# Patient Record
Sex: FEMALE | Race: WHITE
[De-identification: names, ages, dates, MRNs, and addresses within clinical notes are randomized per-mention and may not be internally consistent; named-entity substitution may affect disease eponyms.]

---

## 2017-04-02 ENCOUNTER — HOSPITAL ENCOUNTER (EMERGENCY)
Dept: HOSPITAL 25 - ED | Age: 52
Discharge: HOME | End: 2017-04-02
Payer: COMMERCIAL

## 2017-04-02 VITALS — SYSTOLIC BLOOD PRESSURE: 98 MMHG | DIASTOLIC BLOOD PRESSURE: 62 MMHG

## 2017-04-02 DIAGNOSIS — R51: ICD-10-CM

## 2017-04-02 DIAGNOSIS — M47.892: ICD-10-CM

## 2017-04-02 DIAGNOSIS — M54.2: ICD-10-CM

## 2017-04-02 DIAGNOSIS — S09.90XA: Primary | ICD-10-CM
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
## 2017-04-02 PROCEDURE — 99282 EMERGENCY DEPT VISIT SF MDM: CPT

## 2017-04-02 PROCEDURE — 70450 CT HEAD/BRAIN W/O DYE: CPT

## 2017-04-02 PROCEDURE — 72125 CT NECK SPINE W/O DYE: CPT
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
## 2017-04-02 NOTE — RAD
INDICATION:  Head injury.



COMPARISON:  Comparison is made with a prior CT of the brain from February 18, 2010.



TECHNIQUE: Contiguous axial sections of the brain were obtained from the skull base to the

vertex without contrast.



FINDINGS: The ventricles, cisterns and sulci are within normal limits.  No significant

focal abnormality or mass effect is seen. There is no evidence for hemorrhage.



No significant focal osseous abnormality is seen. The visualized portion of the paranasal

sinuses and mastoid air cells appear clear.



IMPRESSION:  NO EVIDENCE FOR ACUTE INTRACRANIAL ABNORMALITY.

## 2017-04-02 NOTE — RAD
INDICATION:  Head injury, neck pain.



COMPARISON:  Comparison is made with a prior CT of the cervical spine from February 18, 2010.



TECHNIQUE: Contiguous axial sections were obtained from the skull base through the T1

vertebra. Images were reconstructed in the sagittal and coronal planes.



FINDINGS: There is straightening of the cervical spine with loss of the normal cervical

lordosis. No prevertebral soft tissue swelling or fracture is seen. There is a small area

of decreased density in the lateral aspect of the C5 vertebral body which is unchanged

from the prior CT study.



At the C3-C4 level there is mild right posterior lateral uncinate process spurring. No

significant spinal canal narrowing is present. There is mild neural foraminal narrowing on

the right side.



At the C4-C5 level no disc bulge or herniation is seen. No spinal canal or neural

foraminal narrowing is noted.



At the C5-C6 level there is posterior uncinate process spurring which causes mild spinal

canal narrowing and moderate bilateral neural foraminal narrowing right greater than left.



At the C6-C7 level there is mild right posterior lateral uncinate process spurring. No

spinal canal narrowing is present. There is mild neural foraminal narrowing on the right

side.



IMPRESSION:  

1. STRAIGHTENING OF THE CERVICAL SPINE, NO EVIDENCE FOR FRACTURE OR SUBLUXATION.

2. MILD CERVICAL SPONDYLOSIS.

## 2018-06-19 ENCOUNTER — HOSPITAL ENCOUNTER (EMERGENCY)
Dept: HOSPITAL 25 - ED | Age: 53
Discharge: HOME | End: 2018-06-19
Payer: COMMERCIAL

## 2018-06-19 VITALS — SYSTOLIC BLOOD PRESSURE: 104 MMHG | DIASTOLIC BLOOD PRESSURE: 71 MMHG

## 2018-06-19 DIAGNOSIS — M54.2: ICD-10-CM

## 2018-06-19 DIAGNOSIS — Z82.49: ICD-10-CM

## 2018-06-19 DIAGNOSIS — Z88.0: ICD-10-CM

## 2018-06-19 DIAGNOSIS — J45.909: ICD-10-CM

## 2018-06-19 DIAGNOSIS — R11.2: ICD-10-CM

## 2018-06-19 DIAGNOSIS — G43.909: Primary | ICD-10-CM

## 2018-06-19 PROCEDURE — 99283 EMERGENCY DEPT VISIT LOW MDM: CPT

## 2018-06-19 PROCEDURE — 96375 TX/PRO/DX INJ NEW DRUG ADDON: CPT

## 2018-06-19 PROCEDURE — 96374 THER/PROPH/DIAG INJ IV PUSH: CPT

## 2018-06-19 PROCEDURE — 96361 HYDRATE IV INFUSION ADD-ON: CPT

## 2018-06-19 NOTE — ED
Shelley BARFIELD Simon, scribed for Pedro Howard MD on 06/19/18 at 1404 .





Headache





- HPI Summary


HPI Summary: 





This patient is a 52 year old F presenting to [facility] with a chief complaint of 

HA at 10/10 severity since 6/15 night/6/16 morning. Took imitrex pills 1st day, 

then took zomig, imitrex injection today, little help of sx from these 

medications. Today the pain is not quite as severe due to the Demerol she took. 

She tried her fathers Darvon, which did not alleviate sx. She notes the pain 

is primarily located on the left side, but is fairly diffuse; this is typical 

for her migraine sx, but the pain persisting after medication is not. She 

endorses nausea, emesis, dry-heaving, diaphoresis, neck stiffness, insomnia (

ambien did not help), constipation (which she attributes to her meds), and 

sensitivity to sensory stimulation (sound, smell, light). On 6/17 she noted 

tingling, numbness, surrealness, and thought disturbance. She denies fever, 

chills, abd pain, and loss of ROM.





- History Of Current Complaint


Chief Complaint: EDHeadache


Stated Complaint: MIGRAINE


Time Seen by Provider: 06/19/18 13:52


Hx Obtained From: Patient


Hx Last Menstrual Period: 3/16/16


Onset/Duration: Gradual Onset, Started days ago, Still Present


Initially Headache Was: Initial Pain Scale(0-10)= - 10


Currently Pain Is: Current Pain Scale(0-10)= - 10


Timing: Constant, Days


Character: Migraine


Location of Headache: Diffuse, Temporal - left, Parietal - left


Aggravating Factor: Bright Lights, Other - olfactory, auditory, visual sensory 

stimulation in general.


Allevating Factors: Medication - slightly, Other (Noted In Comments) - ice, 

mild help


Associated Signs And Symptoms: Nausea, Vomiting, Neck Pain


Related History: Similar Episode/DX As: - previous migraines





- Allergies/Home Medications


Allergies/Adverse Reactions: 


 Allergies











Allergy/AdvReac Type Severity Reaction Status Date / Time


 


MS Amoxicillin [Amoxicillin] Allergy Intermediate Hives Verified 06/19/18 12:13


 


environmen Allergy  hives Uncoded 06/19/18 12:13





   from,  





   dust,  





   mold,  





   pollen  











Home Medications: 


 Home Medications





Erythromycin TAB* 250 mg PO BID 06/19/18 [History Confirmed 06/19/18]


Meloxicam(NF) [Mobic(NF)] 7.5 mg PO BID 06/19/18 [History Confirmed 06/19/18]


Memantine TAB* [Namenda TAB*] 10 mg PO DAILY 06/19/18 [History Confirmed 06/19/ 18]


Omeprazole CAP* [Prilosec CAP* 20 MG] 40 mg PO BID 06/19/18 [History Confirmed 

06/19/18]


Spironolactone TAB* [Aldactone TAB*] 50 mg PO BID 06/19/18 [History Confirmed 06 /19/18]


Vortioxetine (NF) [Trintellix (NF)] 10 mg PO DAILY 06/19/18 [History Confirmed 

06/19/18]


predniSONE TAB* [Deltasone 20 MG TAB*] 20 mg PO DAILY 06/19/18 [History 

Confirmed 06/19/18]











PMH/Surg Hx/FS Hx/Imm Hx


Endocrine/Hematology History: 


   Denies: Hx Diabetes


Cardiovascular History: 


   Denies: Hx Congestive Heart Failure, Hx Hypertension


Respiratory History: Reports: Hx Asthma


 History: 


   Denies: Hx Renal Disease


Sensory History: 


   Denies: Hx Legally Blind, Hx Deafness


Opthamlomology History: 


   Denies: Hx Legally Blind


EENT History: 


   Denies: Hx Deafness


Neurological History: Reports: Hx Migraine





- Cancer History


Hx Chemotherapy: No


Hx Radiation Therapy: No





- Surgical History


Surgery Procedure, Year, and Place: rhinoplasty/bilateral breast reductions/

tonsils


Infectious Disease History: No


Infectious Disease History: Reports: Hx of Known/Suspected MRSA, Hx Shingles


   Denies: Traveled Outside the [place] in Last 30 Days





- Family History


Known Family History: Positive: Hypertension





- Social History


Alcohol Use: None


Substance Use Type: Reports: None


Smoking Status (MU): Never Smoked Tobacco





Review of Systems


Positive: Skin Diaphoresis.  Negative: Fever, Chills


Positive: Photophobia


Positive: Other - Sensitive to/pain from olfactory, auditory stimulation


Positive: Vomiting, Nausea, Other - constipation.  Negative: Abdominal Pain


Positive: Arthralgia, Myalgia - neck, linked to migraine.  Negative: Decreased 

ROM


Neurological: Other - thought disturbance, surrealness


Positive: Headache, Paresthesia, Numbness


All Other Systems Reviewed And Are Negative: Yes





Physical Exam





- Summary


Physical Exam Summary: 





General: well-appearing, mild pain distress


Skin: warm, color reflects adequate perfusion, dry


Head: normal


Eyes: EOMI, IRENE


ENT: normal


Neck: supple, nontender


Respiratory: CTA, breath sounds present


Cardiovascular: RRR


Abdomen: soft, nontender


Bowel: present


Musculoskeletal: normal, strength/ROM intact


Neurological: sensory/motor intact, A&O x3


Psychological: affect/mood appropriate


Triage Information Reviewed: Yes


Vital Signs On Initial Exam: 


 Initial Vitals











Temp Pulse Resp BP Pulse Ox


 


 97.9 F   79   16   132/88   99 


 


 06/19/18 12:11  06/19/18 12:11  06/19/18 12:11  06/19/18 12:11  06/19/18 12:11











Vital Signs Reviewed: Yes





Diagnostics





- Vital Signs


 Vital Signs











  Temp Pulse Resp BP Pulse Ox


 


 06/19/18 13:40   68   134/89  100


 


 06/19/18 12:11  97.9 F  79  16  132/88  99














- Laboratory


Lab Statement: Any lab studies that have been ordered have been reviewed, and 

results considered in the medical decision making process.





Re-Evaluation





- Re-Evaluation


  ** First Eval


Re-Evaluation Time: 15:27


Change: Improved


Comment: Seeing if sx improved at all (they did, to 8/10 now).





  ** Second Eval


Re-Evaluation Time: 17:15


Change: Improved


Comment: Pt's HA is "gone".





Headache Course/Dx





- Course


Course Of Treatment: Medications reviewed. Allergies noted.  HEADACHE IMPROVED 

IN THE ED.





- Diagnoses


Provider Diagnoses: 


 Migraine








Discharge





- Sign-Out/Discharge


Documenting (check all that apply): Discharge/Admit/Transfer





- Discharge Plan


Condition: Stable


Disposition: HOME


Prescriptions: 


HYDROcodone/ACETAMIN 5-325 MG* [Norco 5-325 TAB*] 1 tab PO Q4H PRN #10 tab MDD 6


 PRN Reason: Pain


Patient Education Materials:  Migraine Headache (ED)


Referrals: 


Dmitry Miguel MD [Primary Care Provider] - 


Additional Instructions: 


FOLLOW UP WITH YOUR DOCTOR.


RETURN TO THE EMERGENCY DEPARTMENT FOR ANY WORSENING OF YOUR CONDITION OR 

QUESTIONS OR CONCERNS.





- Billing Disposition and Condition


Condition: STABLE


Disposition: Home





The documentation as recorded by the Shelley gonzáles Simon accurately 

reflects the service I personally performed and the decisions made by me, 

Pedro Howard MD.

## 2018-07-13 ENCOUNTER — HOSPITAL ENCOUNTER (EMERGENCY)
Dept: HOSPITAL 25 - UCEAST | Age: 53
Discharge: HOME | End: 2018-07-13
Payer: COMMERCIAL

## 2018-07-13 VITALS — DIASTOLIC BLOOD PRESSURE: 77 MMHG | SYSTOLIC BLOOD PRESSURE: 120 MMHG

## 2018-07-13 DIAGNOSIS — Z86.14: ICD-10-CM

## 2018-07-13 DIAGNOSIS — L50.9: Primary | ICD-10-CM

## 2018-07-13 DIAGNOSIS — K21.9: ICD-10-CM

## 2018-07-13 DIAGNOSIS — Z86.19: ICD-10-CM

## 2018-07-13 DIAGNOSIS — Z88.0: ICD-10-CM

## 2018-07-13 DIAGNOSIS — G43.909: ICD-10-CM

## 2018-07-13 DIAGNOSIS — F32.9: ICD-10-CM

## 2018-07-13 DIAGNOSIS — J45.909: ICD-10-CM

## 2018-07-13 DIAGNOSIS — Z82.49: ICD-10-CM

## 2018-07-13 PROCEDURE — G0463 HOSPITAL OUTPT CLINIC VISIT: HCPCS

## 2018-07-13 PROCEDURE — 99212 OFFICE O/P EST SF 10 MIN: CPT

## 2018-07-13 NOTE — XMS REPORT
Elda Parks

 Created on:2018



Patient:Elda Parks

Sex:Female

:1965

External Reference #:[id number]





Demographics







 Address  [address] [place] [address]

 

 Mobile Phone  [phone]

 

 Email Address  [email or web address]

 

 Preferred Language  English

 

 Marital Status  Not  Or 

 

 Mormonism Affiliation  Unknown

 

 Race  White

 

 Ethnic Group  Not  Or 









Author







 Organization  [facility]

 

 Address  [address]



   [place] [address]

 

 Phone  [phone]









Support







 Name  Relationship  Address  Phone

 

 Janelle Parks  Unavailable  Unavailable  [phone]









Care Team Providers







 Name  Role  Phone

 

 Dmitry Miguel MD  Primary Care Physician  Unavailable









Payers







 Type  Date  Identification Numbers  Payment Provider  Subscriber

 

 Commercial  Effective:  Policy Number: [id number]  [employer]  Elda Parks



   2018    Medicaid  









 PayID: [id number]  [address] [address]









 [place] [address]









 Workers  Effective:  Policy Number:  Tamir Parks



 Compensation  2016  [id number]  [place]  









 Onset: 2016  Group Name: [employer] [phone]  [address]









 [place]: [place] [id number]







Problems







 Date  Description  Provider  Status

 

 Onset: 2016  Injury of shoulder region  Elza Douglass MD  Active

 

 Onset: 2016  Sprain of shoulder and upper arm  Elza Douglass MD  Active

 

 Onset: 2016  Brachial neuritis  Elza Douglass MD  Active







Family History







 Date  Family Member(s)  Problem(s)  Comments

 

   General  Heart Disease  

 

   General  Diabetes  

 

   General  Cancer  

 

   Father  Kidney Cancer  

 

   Grandmother  Stomach Cancer  







Social History







 Type  Date  Description  Comments

 

 Marital Status    Single  

 

 Lives With    Alone  

 

 Occupation    Manager  Hotel

 

 ETOH Use    Denies alcohol use  

 

 Smoking    Patient has never smoked  

 

 Recreational Drug Use    Denies Drug Use  

 

 Exercise Type/Frequency    Does not exercise  







Allergies, Adverse Reactions, Alerts







 Date  Description  Reaction  Status  Severity  Comments

 

 2011  Amoxicillin  rash  active    







Medications







 Medication  Date  Status  Form  Strength  Qnty  SIG  Indications  Ordering



                 Provider

 

 Lidocaine 4% Gel    Active    4%  60gra  apply to    Delmer RENO



   /2018        ms  anal canal    MD Reji



             twice a day    

 

 Prednisone    Active  Tablets  20mg  3tabs  2 tabs by    .



             mouth day    Natividad,



             #1, 1 tab    M.D.



             by mouth    



             day #2,    



             take before    



             work, as    



             this    



             medication    



             may prevent    



             sleep    

 

 Zofran    Active  Tablets  4mg  15tab  take 1 by            s  mouth twice    Natividad,



             a day as    M.D.



             needed for    



             nausea    

 

 Aimovig    Active  Solution  70mg/ml  1ml  inject sq        Auto-Inje      once a    Natividad,



       ct      month    M.DJose Rafael

 

 Zonisamide    Active  Capsules  100mg  60cap  1 by mouth  G43.709          s  twice a day    CARLOS Henson

 

 Spironolactone    Active  Tablets  50mg  30tab  1 po qd    Unknown



   /        s      

 

 Ventolin HFA    Active  Aerosol  108(90Bas  1mont  2 puffs po    Unknown



   /      e) mcg/ac  h  qid prn    

 

 Excedrin Extra    Active  Tablets  250-250-6  100ta  1-2 bid prn    
Unknown



 Strength  /0000      5mg  bs      

 

 Bupropion HCL ER    Active  Tablets  150mg    3 tabs qd    Bezirgani



 (XL)  /0000    ER 24HR          Anson julio MD

 

 Hydrocodone-Acetam    Active  Tablets  5-325mg    Take 1    Unknown



 inophen  /          Tablet By    



             Mouth Every    



             6 Hours as    



             Deeded    

 

 Meloxicam    Active  Tablets  15mg    Take 1/2    Unknown



   /          Tablet By    



             Mouth 2    



             Times A Day    

 

 Zolpidem Tartrate    Active  Tablets  12.5mg    1 by mouth    Unknown



 ER  /0000    ER      every night    



             at bedtime    



             prn    

 

 Omeprazole    Active  Capsules  40mg    1 by mouth    Unknown



   /0000    DR      every day    

 

 Propranolol HCL    Active  Tablets  20mg    2 by mouth    Unknown



   /0000          twice a day    

 

 Iron    Active  Tablets  325(65Fe)    1 by mouth    Unknown



   /0000      mg    every day    

 

 Trazodone HCL    Active  Tablets  100mg    1 by mouth    Unknown



   /0000          every night    



             at bedtime    

 

 Imipramine    Active  Capsules  50mg    1 take by    Unknown



   /0000          mouth every    



             day once    



             daiy    

 

 Multi Vitamin    Active  Tablets          Unknown



 Daily  /0000              

 

 Calcium Citrate +    Active  Tablets  600mg    a day    Unknown



   /0000              

 

 Vitamin C  00/00  Active  Chewtabs  500mg    take 1 by    Unknown



   /          mouth every    



             day - take    



             this when    



             you take    



             your iron    



             pill    

 

 Imitrex Statdose    Active  Solution  4mg/0.5ML    inject at    Unknown



 System  /    Auto-Inje      the onset    



       ct      of    



             migraine,    



             may repeat    



             after 1    



             hour if    



             needed; max    



             2/day, max    



             2days/wk    

 

 Xanax    Active  Tablets      by mouth    Unknown



             three times    



             a day as    



             needed    

 

 Duloxetine HCL    Active  Caps DR  60mg    2 by mouth    Unknown



       Part      every day    

 

 Zyrtec Allergy    Active  Capsules  10mg    1 by mouth    Unknown



             every day    



             prn    

 

 Imipramine HCL    Active  Tablets  25mg    1 tab po at    Unknown



   /0000          hs    

 

 Erythromycin    Active  Tablets  250mg    1 by mouth    Unknown



             twice a day    



             for acne    

 

 Vitamin D3 Super    Active  Capsules      1 by mouth    Unknown



 Strength  /0000          every day    

 

                 

 

 Memantine HCL    Hx  Tablets  10mg  30tab  1 po a day  G43.009   S.



   /        pipo Henson M.D.

 

 Zolmitriptan    Hx  Tablets  5mg  9tabs  1 po bid  G43.719  .



   /          prMarcelle Guerra          migraine    M.D.



             max 2 per    



             day, 2    



             days/wk    

 

 Leflunomide    Hx  Tablets  20mg  30tab  1 po qd            pipo Jacinto M.D.

 

 Folic Acid    Hx  Tablets  1mg  30tab  1 po qd            Marcelle Madera M.D.



                 

 

 Prednisone    Hx  Tablets  1mg  60tab  2 po qd            s      Marcelle Jacinto              MJose RafaelDJose Rafael



                 

 

 Hydroxychloroquine    Hx  Tablets  200mg  60tab  1 po bid    Gilberto



 Sulfate  /2011        s      Ophelia



   -              M.DJose Rafael



                 

 

 Methotrexate    Hx  Tablets  2.5mg  24tab  Take Six            s  Tablets By    Endo,



   -          Mouth Once    M.D.



             A Week    



   /2011              

 

 Fluoxetine HCL    Hx  Capsules  40mg  90cap  1 po qd    Unknown



           s      

 

 Sumatriptan    Hx  Tablets  25mg  30tab  1 tablet    Unknown



 Succinate  /0000        s  every 2    



   -          hourly    



             unitl    



   /2016          relief.    



             maximum 4    



             tablets    



             daily    

 

 Zyrtec Allergy    Hx  Tablets  10mg  30tab  1 po qd    Unknown



   /0000        s      



   -              



                 

 

 Propranolol HCL    Hx  Tablets  40mg  180ta  1 po bid    Unknown



   /0000        bs      



   -              



                 

 

 Naproxen Sodium    Hx  Tablets  220mg  60tab  1 po bid    Unknown



   /0000        s  prn    



   -              



                 

 

 Motrin Ib    Hx  Tablets  200mg  60tab  prn    Unknown



   /0000        s      



   -              



                 

 

 Abilify    Hx  Tablets  5mg  30tab  1 po qd    Unknown



   /        s      



   -              



                 

 

 Erythromycin Base    Hx  Tablets  250mg    Take 1    Unknown



   /0000          Tablet By    



             Mouth Twice    



             A Day    

 

 Voltaren    Hx  Gel  1%    Apply 2    Unknown



   /0000          [employer]    



   -          Topically    



   10/27          Twice Daily    



             as Needed    

 

 Cyclobenzaprine    Hx  Tablets  5mg    Take 1    Unknown



 HCL  /0000          Tablet By    



             Mouth 3    



             Times A Day    



             as Needed    



             For Muscle    



             Spasms    

 

 Imitrex    Hx  Tablets  100mg    take 1 by  G43.719  Unknown



   /0000          mouth as    



   -          need for    



             migraine,    



   /2017          may repeat    



             after 2    



             hours,    



             maximum 2    



             tablets in    



             24 hours,    



             maximum 2    



             days a    



             week.    







Medications Administered in Office







 Medication  Date  Status  Form  Strength  Qnty  SIG  Indications  Ordering



                 Provider

 

 Injection  10/28  Administered  Injection          Janet



 Onabotulinumtoxin  /              DESMOND Esquivel, 1 Unit                NP







Immunizations







 CPT Code  Status  Date  Vaccine  Lot #

 

 [id number]  Given  2011  Influenza Virus 3Yrs & Over  [id number]







Vital Signs







 Date  Vital  Result  Comment

 

 2018  Height  67 inches  5'7"









 Weight  206.00 lb  

 

 Heart Rate  88 /min  

 

 BP Systolic  118 mmHg  

 

 BP Diastolic  82 mmHg  

 

 Respiratory Rate  18 /min  

 

 Body Temperature  97.9 F  

 

 BMI (Body Mass Index)  32.3 kg/m2  









 2018  Height  67 inches  5'7"









 Weight  214.00 lb  

 

 Heart Rate  72 /min  

 

 BP Systolic  110 mmHg  

 

 BP Diastolic  88 mmHg  

 

 BMI (Body Mass Index)  33.5 kg/m2  









 2017  Height  67 inches  5'7"









 Weight  248.00 lb  

 

 Heart Rate  54 /min  

 

 BP Systolic  109 mmHg  

 

 BP Diastolic  56 mmHg  

 

 Body Temperature  97.6 F  

 

 Pain Level  4  

 

 BMI (Body Mass Index)  38.8 kg/m2  









 2017  Height  67 inches  5'7"









 Weight  240.00 lb  

 

 Heart Rate  82 /min  

 

 BP Systolic Sitting  128 mmHg  

 

 BP Diastolic Sitting  78 mmHg  

 

 Respiratory Rate  16 /min  

 

 BMI (Body Mass Index)  37.6 kg/m2  









 2017  Height  67 inches  5'7"









 Weight  245.00 lb  

 

 Heart Rate  72 /min  

 

 BP Systolic  130 mmHg  

 

 BP Diastolic  74 mmHg  

 

 Respiratory Rate  18 /min  

 

 Body Temperature  98.3 F  

 

 BMI (Body Mass Index)  38.4 kg/m2  









 2017  Height  67 inches  5'7"









 Weight  248.00 lb  

 

 Heart Rate  64 /min  

 

 Respiratory Rate  16 /min  

 

 Pain Level  4  

 

 BMI (Body Mass Index)  38.8 kg/m2  









 2016  Height  67 inches  5'7"









 Weight  248.00 lb  

 

 Respiratory Rate  18 /min  

 

 Pain Level  0  

 

 BMI (Body Mass Index)  38.8 kg/m2  









 10/28/2016  Height  67 inches  5'7"









 Weight  248.00 lb  

 

 Heart Rate  74 /min  

 

 BP Systolic Sitting  112 mmHg  

 

 BP Diastolic Sitting  76 mmHg  

 

 BMI (Body Mass Index)  38.8 kg/m2  









 10/18/2016  Height  67 inches  5'7"









 Weight  243.00 lb  

 

 Respiratory Rate  16 /min  

 

 Pain Level  2  

 

 BMI (Body Mass Index)  38.1 kg/m2  









 2016  Height  67 inches  5'7"









 Weight  243.00 lb  

 

 Heart Rate  76 /min  

 

 BP Systolic Sitting  108 mmHg  

 

 BP Diastolic Sitting  86 mmHg  

 

 Respiratory Rate  14 /min  

 

 BMI (Body Mass Index)  38.1 kg/m2  









 2016  Height  67 inches  5'7"









 Weight  240.00 lb  

 

 Respiratory Rate  16 /min  

 

 Pain Level  4  

 

 BMI (Body Mass Index)  37.6 kg/m2  









 2016  Height  67 inches  5'7"









 Weight  240.00 lb  

 

 BP Systolic  115 mmHg  

 

 BP Diastolic  74 mmHg  

 

 Pain Level  5  

 

 BMI (Body Mass Index)  37.6 kg/m2  









 2011  Height  63 inches  5'3"









 Weight  240.00 lb  

 

 Heart Rate  76 /min  

 

 BP Systolic Sitting  120 mmHg  

 

 BP Diastolic Sitting  75 mmHg  

 

 BMI (Body Mass Index)  42.5 kg/m2  









 2011  Height  63 inches  5'3"









 Weight  240.00 lb  

 

 Heart Rate  68 /min  

 

 BP Systolic  118 mmHg  

 

 BP Diastolic  74 mmHg  

 

 BMI (Body Mass Index)  42.5 kg/m2  









 2011  Height  63 inches  5'3"









 Weight  240.00 lb  

 

 Heart Rate  80 /min  

 

 BP Systolic  110 mmHg  

 

 BP Diastolic  72 mmHg  

 

 BMI (Body Mass Index)  42.5 kg/m2  







Results







 Test  Date  Test  Result  H/L  Range  Note

 

 CBC No Diff  2017  White Blood Count  9.1 10^3/uL    3.5-10.8  









 Red Blood Count  4.22 10^6/uL    4.0-5.4  

 

 Hemoglobin  12.9 g/dL    12.0-16.0  

 

 Hematocrit  38 %    35-47  

 

 Mean Corpuscular Volume  91 fL    80-97  

 

 Mean Corpuscular Hemoglobin  31 pg    27-31  

 

 Mean Corpuscular HGB Conc  34 g/dL    31-36  

 

 Red Cell Distribution Width  13 %    10.5-15  

 

 Platelet Count  296 10^3/uL    150-450  

 

 Mean Platelet Volume  9 um3    7.4-10.4  









 Comp Metabolic Panel  2017  Sodium  138 mmol/L    133-145  









 Potassium  3.9 mmol/L    3.5-5.0  

 

 Chloride  103 mmol/L    101-111  

 

 Co2 Carbon Dioxide  28 mmol/L    22-32  

 

 Anion Gap  7 mmol/L    2-11  

 

 Glucose  89 mg/dL      

 

 Blood Urea Nitrogen  12 mg/dL    6-24  

 

 Creatinine  0.91 mg/dL    0.51-0.95  

 

 BUN/Creatinine Ratio  13.2    8-20  

 

 Calcium  8.7 mg/dL    8.6-10.3  

 

 Total Protein  6.4 g/dL    6.4-8.9  

 

 Albumin  3.9 g/dL    3.2-5.2  

 

 Globulin  2.5 g/dL    2-4  

 

 Albumin/Globulin Ratio  1.6    1-3  

 

 Total Bilirubin  0.40 mg/dL    0.2-1.0  

 

 Alkaline Phosphatase  64 U/L      

 

 Alt  14 U/L    7-52  

 

 Ast  19 U/L    13-39  

 

 Egfr Non-  65.2    >60  

 

 Egfr   83.8    >60  1









 Iron & Iron Binding Capacity  2017  Iron  69 g/dL      









 Unsaturated Iron Binding  245 g/dL      

 

 Total Iron Binding Capacity  314 g/dL    250-450  

 

 % Iron Saturation  22 %    15-55  









 Laboratory test finding  2017  Cortisol  3.60 g/dL      2









 TSH (Thyroid Stim Horm)  4.45 mcIU/mL    0.34-5.60  

 

 Folic Acid (Folate)  > 20.00 ng/mL    >3.99  

 

 Vitamin B12  576 pg/mL    180-914  3

 

 Vitamin D Total 25(Oh)  28.2 ng/mL    20-50  

 

 Hemoglobin A1c  5.1 %    4.0-5.6  4

 

 Vitamin B1 (Whole Blood)  115 nmol/L      5









 Vitamin B6  2017  Pyridoxal 5-Phosphate  15 g/L    5-50  6









 Pyridoxic Acid  9 g/L    3-30  7









 1  *******Because ethnic data is not always readily available,



   this report includes an eGFR for both -Americans and



   non- Americans.****



   The National Kidney Disease Education Program (NKDEP) does



   not endorse the use of the MDRD equation for patients that



   are not between the ages of 18 and 70, are pregnant, have



   extremes of body size, muscle mass, or nutritional status,



   or are non- or non-.



   According to the National Kidney Foundation, irrespective of



   diagnosis, the stage of the disease is based on the level of



   kidney function:



   Stage Description                      GFR(mL/min/1.73 m(2))



   1     Kidney damage with normal or decreased GFR       90



   2     Kidney damage with mild decrease in GFR          60-89



   3     Moderate decrease in GFR                         30-59



   4     Severe decrease in GFR                           15-29



   5     Kidney failure                       <15 (or dialysis)

 

 2  AM 8.7-22.4



   PM <10

 

 3  Normal Range 180 to 914



   Indeterminate Range 145 to 180



   Deficient Range  <145

 

 4  Therapeutic target for the treatment of diabetes



   mellitus patients is <7% HBA1C, and in selective



   patients <6.0%.  Please refer to American Diabetes



   Association diabetic care guidelines for further



   information.

 

 5  -------------------ADDITIONAL INFORMATION-------------------



   This test was developed and its performance characteristics



   determined by [facility] in a manner consistent with CLIA



   requirements. This test has not been cleared or approved by



   the U.S. Food and Drug Administration.



   Test Performed by:



   [facility] - [address] [address]

 

 6  -------------------ADDITIONAL INFORMATION-------------------



   This test was developed and its performance characteristics



   determined by [facility] in a manner consistent with CLIA



   requirements. This test has not been cleared or approved by



   the U.S. Food and Drug Administration.

 

 7  -------------------ADDITIONAL INFORMATION-------------------



   This test was developed and its performance characteristics



   determined by [facility] in a manner consistent with CLIA



   requirements. This test has not been cleared or approved by



   the U.S. Food and Drug Administration.



   Test Performed by:



   [facility]



   [address] [place] [address]







Procedures







 Date  CPT Code  Description  Status

 

 2018  45078  Anoscopy  Completed

 

 2017  20166  Anoscopy  Completed

 

 2017    Mammogram  Completed

 

 10/28/2016  26622  Chemodenervation Of Muscles Innervated By Facial  Completed



     Nerves, Bilat  







Encounters







 Type  Date  Location  Provider  CPT E/M  Dx

 

 Office Visit  2018  [facility]  Abisai Henson,  [address]  
G43.009



   9:30a    M.D.    

 

 Office Visit  2017  [facility]  Elza Douglass MD  [address]  
S46.101D



   3:00p  [facility]      









 S46.011D

 

 S46.011D









 Office Visit  2017  3:15p  [facility]  Abisai NICOLE  [address]  
G43.719



       CARLOS Henson    

 

 Office Visit  2017 10:30a  [facility]  Delmer Mendoza,  
[address]  K64.4



     Veronica EPSTEIN    









 E66.01

 

 K21.9









 Office Visit  2017  8:15a  [facility]  Elza Douglass MD  
[address]  S46.101D



     [facility]      









 S46.011D

 

 M75.41

 

 M75.51

 

 M54.2









 Office Visit  2016  3:30p  [facility]  Elza Douglass MD  
[address]  S46.101A



     [facility]      









 S46.011A

 

 M54.12

 

 S46.101D

 

 S46.011A

 

 S46.011D









 Office Visit  10/18/2016  1:00p  [facility]  Elza Douglass MD  
[address]  S46.101A



     [facility]      









 S46.011A

 

 M54.12

 

 S46.101D

 

 S46.011A

 

 S46.011D









 Office Visit  2016 11:00a  [facility]  Abisai Henson,  [address]  
G43.719



     [facility] G[facility]ZALE[facility]    

 

 Office Visit  2016  1:00p  [facility]  Elza Douglass [place]  [address]  
S46.101A



     Of [facility]      









 S46.011A

 

 S46.011A

 

 M54.12

 

 M54.12

 

 M75.21

 

 M75.21









 Office Visit  2016  2:00p  [facility]  Elza Douglass MD  
[address]  S46.101A



     [facility]      









 S46.101A

 

 S46.011A

 

 S46.011A

 

 M25.511

 

 M25.511









 Office Visit  2011  9:20a  [facility]  Gilberto Jacinto  [address]  
V04.81



     Of [facility] GONZALEZ    









 696.0

 

 V58.69









 Office Visit  2011  3:20p  [facility]  Gilberto Jacinto M.D.  
[address]  696.0



     Of [facility]      









 V58.69

 

 354.0









 Office Visit  2011 11:20a  [facility]  Gilberto Jacinto M.D.  
[address]  696.0



     Of [facility]      









 V58.69







Plan of Care

Future Appointment(s):2018  3:00 pm - [facility] Mendoza MD at [facility]2018  3:45 pm - Abisai Henson M.D. at [facility]2018 - [facility] Mendoza MDK60.1 Chronic anal 
xhhkldkZ12.4 Residual hemorrhoidal skin tags

## 2018-07-13 NOTE — UC
Skin Complaint HPI





- HPI Summary


HPI Summary: 





This pt is a 53 y/o female presenting to [facility] c/o rash on the right side of 

abdomen for the past few days now. She describes her rash as red and pruritic. 

Pt notes her rash has become very pruritic. Pt reports her rash is also painful 

on palpation. Denies involvement of mouth or face, nausea, vomiting, fever, 

chills, difficulty breathing. 


PMHx: shingles, MRSA. 





- History of Current Complaint


Chief Complaint: UCRash


Time Seen by Provider: 07/13/18 16:03


Stated Complaint: RASH


Hx Obtained From: Patient


Hx Last Menstrual Period: [id number]


Onset/Duration: Lasting Days, Still Present


Skin Exposure Onset/Duration: Days Ago


Timing: Constant


Current Severity: Moderate


Pain Intensity: 6


Pain Scale Used: 0-10 Numeric


Location: Other - Right side of torso


Character: Pruritus, Pain


Aggravating Factor(s): Nothing


Alleviating Factor(s): Nothing


Associated Signs & Symptoms: Positive: Rash.  Negative: Nausea, Vomiting, 

Difficulty Breathing, Fever, Chills





- Allergy/Home Medications


Allergies/Adverse Reactions: 


 Allergies











Allergy/AdvReac Type Severity Reaction Status Date / Time


 


amoxicillin Allergy  Hives Verified 07/13/18 15:46


 


environmen Allergy  hives Uncoded 06/19/18 12:13





   from,  





   dust,  





   mold,  





   pollen  














Review of Systems


Constitutional: Negative


Skin: Rash


Eyes: Negative


ENT: Negative


Respiratory: Negative


Cardiovascular: Negative


Gastrointestinal: Negative


Genitourinary: Negative


Motor: Negative


Neurovascular: Negative


Musculoskeletal: Negative


Neurological: Negative


Psychological: Negative


All Other Systems Reviewed And Are Negative: Yes





PMH/Surg Hx/FS Hx/Imm Hx





- Additional Past Medical History


Additional PMH: 





PMHx: shingles, MRSA


Respiratory History: Asthma


GI/ History: Gastroesophageal Reflux


Neurological History: Migraine


Psychological History: Depression





- Surgical History


Surgical History: Yes


Surgery Procedure, Year, and Place: rhinoplasty/bilateral breast reductions/

tonsils





- Family History


Known Family History: Positive: Hypertension





- Social History


Alcohol Use: None


Substance Use Type: None


Smoking Status (MU): Never Smoked Tobacco





Physical Exam





- Summary


Physical Exam Summary: 





VITAL SIGNS: Reviewed.


GENERAL: Patient is a well-developed and nourished female who is lying 

comfortable in the stretcher. Patient is not in any acute respiratory distress.


HEAD AND FACE: Normocephalic


EYES: PERRLA, EOMI x 2.


EARS: Hearing grossly intact.


MOUTH: Oropharynx within normal limits.


NECK: Supple, trachea is midline, no adenopathy, no JVD, no carotid bruit.


CHEST: Symmetric, no tenderness at palpation


LUNGS: Clear to auscultation bilaterally. No wheezing or crackles.


CVS: Regular rate and rhythm, S1 and S2 present, no murmurs or gallops 

appreciated.


ABDOMEN: Soft, non-tender. Bowel sounds are normal. No abdominal abnormal 

pulsations.


EXTREMITIES: Full ROM in all major joints, no edema, no cyanosis or clubbing.


NEURO: Alert and oriented x 3. No acute neurological deficits. Speech is normal 

and follows commands.


SKIN: Dry and warm. Papular rash without vesicles on the right side of abdomen.


Triage Information Reviewed: Yes


Vital Signs: 


 Initial Vital Signs











Temp  97.4 F   07/13/18 15:39


 


Pulse  63   07/13/18 15:39


 


Resp  16   07/13/18 15:39


 


BP  120/77   07/13/18 15:39


 


Pulse Ox  99   07/13/18 15:39











Vital Signs Reviewed: Yes





Course/Dx





- Course


Course Of Treatment: Pt is a 53 y/o female presenting to [facility] c/o rash on the 

right side of abdomen for the past few days now. She describes her rash as red 

and pruritic. Pt notes her rash has become very pruritic. Pt reports her rash 

is also painful on palpation. Denies involvement of mouth or face, nausea, 

vomiting, fever, chills, difficulty breathing.  PMHx: shingles, MRSA.  On exam 

pt has urticaria rash. Pt will be discharged home with a prescription for 

Levocetirizine.  Pt was instructed to return to the urgent care or go to ER 

immediately if any of the symptoms return or worsens. Plan of care was 

discussed with the patient and pt understands and agrees. All questions were 

answered to patient satisfaction. There were no further complaints or concerns. 

Pt will be discharged to home with follow up from PCP. Pt is hemodynamically 

stable, alert and oriented x3.





- Diagnoses


Provider Diagnoses: Urticaria





Discharge





- Sign-Out/Discharge


Documenting (check all that apply): Patient Departure - Discharge





- Discharge Plan


Condition: Stable


Disposition: HOME


Prescriptions: 


LevoCETirizine TAB (NF) [Xyzal TAB (NF)] 5 mg PO DAILY #20 tab


Patient Education Materials:  Urticaria (ED)


Referrals: 


Dmitry Miguel MD [Primary Care Provider] - 


Additional Instructions: 


Take medications as instructed and adhere to plan


Take Acetaminophen or ibuprofen for pain or fever


Increase your fluid intake


Return to the  or go to the emergency department if symptoms worsen


Follow-up with primary care physician in next 2-3 days

## 2020-03-14 RX ADMIN — SODIUM CHLORIDE, SODIUM LACTATE, POTASSIUM CHLORIDE, AND CALCIUM CHLORIDE SCH MLS/HR: 600; 310; 30; 20 INJECTION, SOLUTION INTRAVENOUS at 15:30

## 2020-03-18 ENCOUNTER — HOSPITAL ENCOUNTER (INPATIENT)
Dept: HOSPITAL 25 - AA | Age: 55
LOS: 2 days | Discharge: HOME | DRG: 403 | End: 2020-03-20
Attending: SURGERY | Admitting: SURGERY
Payer: COMMERCIAL

## 2020-03-18 DIAGNOSIS — K21.9: ICD-10-CM

## 2020-03-18 DIAGNOSIS — E78.5: ICD-10-CM

## 2020-03-18 DIAGNOSIS — E66.01: Primary | ICD-10-CM

## 2020-03-18 DIAGNOSIS — Z82.49: ICD-10-CM

## 2020-03-18 DIAGNOSIS — G47.33: ICD-10-CM

## 2020-03-18 DIAGNOSIS — F41.8: ICD-10-CM

## 2020-03-18 DIAGNOSIS — Z80.8: ICD-10-CM

## 2020-03-18 DIAGNOSIS — M19.90: ICD-10-CM

## 2020-03-18 DIAGNOSIS — G43.909: ICD-10-CM

## 2020-03-18 DIAGNOSIS — Z79.899: ICD-10-CM

## 2020-03-18 DIAGNOSIS — Z88.1: ICD-10-CM

## 2020-03-18 DIAGNOSIS — J45.909: ICD-10-CM

## 2020-03-18 DIAGNOSIS — Z83.438: ICD-10-CM

## 2020-03-18 DIAGNOSIS — Z82.61: ICD-10-CM

## 2020-03-18 PROCEDURE — 74246 X-RAY XM UPR GI TRC 2CNTRST: CPT

## 2020-03-18 PROCEDURE — C1776 JOINT DEVICE (IMPLANTABLE): HCPCS

## 2020-03-18 PROCEDURE — 81025 URINE PREGNANCY TEST: CPT

## 2020-03-18 PROCEDURE — 43644 LAP GASTRIC BYPASS/ROUX-EN-Y: CPT

## 2020-03-18 PROCEDURE — 0D164ZA BYPASS STOMACH TO JEJUNUM, PERCUTANEOUS ENDOSCOPIC APPROACH: ICD-10-PCS | Performed by: SURGERY

## 2020-03-18 RX ADMIN — HEPARIN SODIUM SCH UNITS: 5000 INJECTION INTRAVENOUS; SUBCUTANEOUS at 22:34

## 2020-03-18 RX ADMIN — ONDANSETRON PRN MG: 2 INJECTION INTRAMUSCULAR; INTRAVENOUS at 13:16

## 2020-03-18 RX ADMIN — FENTANYL CITRATE PRN MCG: 0.05 INJECTION, SOLUTION INTRAMUSCULAR; INTRAVENOUS at 13:27

## 2020-03-18 RX ADMIN — HYDROMORPHONE HYDROCHLORIDE PRN MG: 1 INJECTION, SOLUTION INTRAMUSCULAR; INTRAVENOUS; SUBCUTANEOUS at 21:14

## 2020-03-18 RX ADMIN — HEPARIN SODIUM SCH UNITS: 5000 INJECTION INTRAVENOUS; SUBCUTANEOUS at 15:08

## 2020-03-18 RX ADMIN — ONDANSETRON PRN MG: 2 INJECTION INTRAMUSCULAR; INTRAVENOUS at 21:14

## 2020-03-18 RX ADMIN — KETOROLAC TROMETHAMINE SCH MG: 15 INJECTION, SOLUTION INTRAMUSCULAR; INTRAVENOUS at 19:13

## 2020-03-18 RX ADMIN — FENTANYL CITRATE PRN MCG: 0.05 INJECTION, SOLUTION INTRAMUSCULAR; INTRAVENOUS at 13:15

## 2020-03-18 RX ADMIN — KETOROLAC TROMETHAMINE SCH MG: 15 INJECTION, SOLUTION INTRAMUSCULAR; INTRAVENOUS at 13:15

## 2020-03-18 RX ADMIN — HYDROMORPHONE HYDROCHLORIDE PRN MG: 1 INJECTION, SOLUTION INTRAMUSCULAR; INTRAVENOUS; SUBCUTANEOUS at 13:08

## 2020-03-18 RX ADMIN — FENTANYL CITRATE PRN MCG: 0.05 INJECTION, SOLUTION INTRAMUSCULAR; INTRAVENOUS at 13:04

## 2020-03-18 RX ADMIN — FENTANYL CITRATE PRN MCG: 0.05 INJECTION, SOLUTION INTRAMUSCULAR; INTRAVENOUS at 13:10

## 2020-03-18 RX ADMIN — SODIUM CHLORIDE, SODIUM LACTATE, POTASSIUM CHLORIDE, AND CALCIUM CHLORIDE SCH MLS/HR: 600; 310; 30; 20 INJECTION, SOLUTION INTRAVENOUS at 21:00

## 2020-03-18 RX ADMIN — PROCHLORPERAZINE EDISYLATE PRN MG: 5 INJECTION INTRAMUSCULAR; INTRAVENOUS at 15:23

## 2020-03-18 NOTE — BRIEFOPN
Brief Operative/Procedure Note





- Operation Details


Pre-Op Diagnosis: Obesity


Post-Op Diagnosis: Obesity


Procedures: Laparoscopic madisyn en y gastric bypass


Surgeon(s)/Proceduralists: Dr. Mendoza.  Assist: COLIN Cortes


Anesthesia: GETA


Estimated Blood Loss: <20cc


Findings: As above


Specimen(s)/Culture(s) Description: None


Complications: None

## 2020-03-19 RX ADMIN — HYDROMORPHONE HYDROCHLORIDE PRN MG: 1 INJECTION, SOLUTION INTRAMUSCULAR; INTRAVENOUS; SUBCUTANEOUS at 11:58

## 2020-03-19 RX ADMIN — SODIUM CHLORIDE, SODIUM LACTATE, POTASSIUM CHLORIDE, AND CALCIUM CHLORIDE SCH MLS/HR: 600; 310; 30; 20 INJECTION, SOLUTION INTRAVENOUS at 03:49

## 2020-03-19 RX ADMIN — SODIUM CHLORIDE, SODIUM LACTATE, POTASSIUM CHLORIDE, AND CALCIUM CHLORIDE SCH MLS/HR: 600; 310; 30; 20 INJECTION, SOLUTION INTRAVENOUS at 11:13

## 2020-03-19 RX ADMIN — ONDANSETRON PRN MG: 2 INJECTION INTRAMUSCULAR; INTRAVENOUS at 08:32

## 2020-03-19 RX ADMIN — PROCHLORPERAZINE EDISYLATE PRN MG: 5 INJECTION INTRAMUSCULAR; INTRAVENOUS at 00:24

## 2020-03-19 RX ADMIN — KETOROLAC TROMETHAMINE SCH MG: 15 INJECTION, SOLUTION INTRAMUSCULAR; INTRAVENOUS at 18:56

## 2020-03-19 RX ADMIN — HEPARIN SODIUM SCH UNITS: 5000 INJECTION INTRAVENOUS; SUBCUTANEOUS at 21:15

## 2020-03-19 RX ADMIN — HEPARIN SODIUM SCH UNITS: 5000 INJECTION INTRAVENOUS; SUBCUTANEOUS at 13:26

## 2020-03-19 RX ADMIN — DEXTROSE MONOHYDRATE, SODIUM CHLORIDE, AND POTASSIUM CHLORIDE SCH MLS/HR: 50; 4.5; 1.49 INJECTION, SOLUTION INTRAVENOUS at 14:08

## 2020-03-19 RX ADMIN — SUMATRIPTAN SUCCINATE PRN MG: 100 TABLET, FILM COATED ORAL at 15:37

## 2020-03-19 RX ADMIN — KETOROLAC TROMETHAMINE SCH MG: 15 INJECTION, SOLUTION INTRAMUSCULAR; INTRAVENOUS at 01:05

## 2020-03-19 RX ADMIN — SUMATRIPTAN SUCCINATE PRN MG: 100 TABLET, FILM COATED ORAL at 22:33

## 2020-03-19 RX ADMIN — KETOROLAC TROMETHAMINE SCH MG: 15 INJECTION, SOLUTION INTRAMUSCULAR; INTRAVENOUS at 13:28

## 2020-03-19 RX ADMIN — HYDROCODONE BITARTRATE AND ACETAMINOPHEN PRN ML: 7.5; 325 SOLUTION ORAL at 21:14

## 2020-03-19 RX ADMIN — KETOROLAC TROMETHAMINE SCH MG: 15 INJECTION, SOLUTION INTRAMUSCULAR; INTRAVENOUS at 06:26

## 2020-03-19 RX ADMIN — DEXTROSE MONOHYDRATE, SODIUM CHLORIDE, AND POTASSIUM CHLORIDE SCH MLS/HR: 50; 4.5; 1.49 INJECTION, SOLUTION INTRAVENOUS at 23:23

## 2020-03-19 RX ADMIN — HEPARIN SODIUM SCH UNITS: 5000 INJECTION INTRAVENOUS; SUBCUTANEOUS at 06:23

## 2020-03-19 RX ADMIN — HYDROMORPHONE HYDROCHLORIDE PRN MG: 1 INJECTION, SOLUTION INTRAMUSCULAR; INTRAVENOUS; SUBCUTANEOUS at 00:27

## 2020-03-19 NOTE — OP
CC:  [facility]; Primary Care Doctor *

 

DATE OF OPERATION:  03/18/20 - ROOM #352

 

DATE OF BIRTH:  [birth year]

 

SURGEON:  Delmer Mendoza MD

 

ASSISTANT:  COLIN Choudhury

 

ANESTHESIA:  General anesthesia.

 

PRE-OP DIAGNOSIS:  Clinically severe obesity.

 

POST-OP DIAGNOSIS:  Clinically severe obesity.

 

OPERATIVE PROCEDURE:  Laparoscopic Alicja-en-Y gastric bypass.

 

ESTIMATED BLOOD LOSS:  Minimal.

 

FLUIDS:  Minimal crystalloid fluid given.  Please see anesthesia report for 
those details.

 

DRAINS:  None.

 

COUNTS:  Lap pad count and instrument count correct at the end of the procedure.

 

COMPLICATIONS:  None.

 

DESCRIPTION OF PROCEDURE:  The patient was identified in the preoperative area. 
Consent was signed.  She was marked, brought to the operating room, placed on 
the operating table in supine position.  Preoperative antibiotics were given. 
Sequential devices were placed on bilateral lower extremities.  General 
anesthesia was induced.  The patient's abdomen was prepped and draped in 
standard surgical fashion, and a time-out was performed.

 

Folds of the umbilicus were elevated anteriorly and a Veress needle was 
inserted into the abdominal cavity, which was then allowed to insufflate to a 
pressure of 15 mmHg.  The patient tolerated the insufflation well.  Midway 
between the xiphoid and umbilicus, an optical 12 mm trocar was inserted just 
left of midline.  Laparoscope was inserted through this and there was no 
evidence of injury from the trocar insertion or from the Veress needle, which 
was then removed.

 

Additional trocars were then placed in the following positions:  A 12 mm and 5 
mm in the right upper quadrant and a 12 mm and 5 mm in the left upper quadrant.

 

The table was placed in a steep reverse Trendelenburg.  A Yenny retractor 
was inserted through the subxiphoid incision and the liver was retracted 
anteriorly to the right.

 

This exposed the gastroesophageal fat pad which was grasped and retracted 
towards the right lower quadrant.  Blunt dissection was carried out to expose 
the left crura.

 

Next, we made a retrogastric tunnel at the second crossing vessel on the lesser 
curvature.  A 45 mm tan ALIYAH stapling device was fired across this and we 
completed the stomach pouch with 2 additional 60 mm stapling devices placing a 

32-Syriac orogastric tube prior to firing these.  The pouch appeared 
appropriate size. Hemostasis was achieved with additional clips at the staple 
lines.

 

Next, the transverse colon was reflected superiorly.  The ligament of Treitz 
was identified and approximately 40 cm was counted off.  A window was made in 
the mesentery and the small bowel was grasped in an omega loop and extended 
towards the stomach pouch.  It showed no evidence of tension.  Stay sutures 
were placed using 2- 0 silk sutures.

 

Next, gastrostomy was made anteriorly over the orogastric tube and enterotomy 
made and the two were matted with a 30 mm tan ALIYAH stapling device to create the 
gastric jejunostomy.  The common defect was reapproximated with a running 

3-0 PDS suture.

 

Next, we transected the bowel with 60 mm tan ALIYAH stapling device leaving just a 
small cuff of small bowel up against the gastrojejunostomy, which was then 
tested with a methylene blue dye test that was negative.

 

Attention was then turned towards the Alicja limb, approximately 70 cm was 
counted off, and this was placed in apposition to the biliopancreatic limb.  
Anastomosis was created with a 60 mm tan ALIYAH stapling device and the common 
defect was reapproximated with interrupted 2-0 silk sutures in a figure-of-
eight fashion.  The mesenteric defect was closed similarly.  Review of the 
abdomen showed no bleeding. The Yenny retractor was removed.  The table was 
repositioned back to neutral. The trocars were removed under direct vision and 
all incisions reapproximated with 4-0 Monocryl subcuticular sutures followed by 
Steri-Strips and sterile dressing. The patient tolerated the procedure well, 
was awoken up in the OR and transferred to the PACU in stable condition.

 

 [id number]/CPS #: [id number]

KAROLINA

## 2020-03-19 NOTE — PN
Progress Note





- Progress Note


Date of Service: 03/19/20


SOAP: 


Subjective: NAD  C/O Migraine Headache   + Flatus and Void, No BM  Minimal 

nausea  tolerating Carleen Clears


[]








Objective:


 Vital Signs











Temp  99.1 F   03/19/20 08:00


 


Pulse  77   03/19/20 08:00


 


Resp  18   03/19/20 12:43


 


BP  115/62   03/19/20 08:00


 


Pulse Ox  96   03/19/20 08:00








 Intake & Output











 03/18/20 03/19/20 03/19/20





 18:59 06:59 18:59


 


Intake Total 1800 1962 402


 


Output Total 420 450 800


 


Balance 1380 1512 -398


 


Weight 247 lb 3.2 oz  


 


Intake:   


 


  IV Fluids 1800 1962 402


 


    LR 1800 1962 402


 


  Oral  0 0


 


Output:   


 


  Urine 400 450 800


 


  Estimated Blood Loss 20  








PEX


GEN:    NAD


Chest: CTAB


ABD:    Obese, Incision sites dressings C/D/I, Hypo BS's


Ext:     Calves soft non tender


[]








Assessment:55 yo female POD 1 S/P Laparoscopic Alicja en Y Gastric Bypass.  Stable

, started on carleen clears after UGI showed no leak


[]








Plan: Bariatric clears, SCD's, IS, encouraged deep breathing and ambulating, 

start Imitrex tab for migraine, DVT prophylaxis SQ Heparin


[]

## 2020-03-20 VITALS — SYSTOLIC BLOOD PRESSURE: 125 MMHG | DIASTOLIC BLOOD PRESSURE: 82 MMHG

## 2020-03-20 RX ADMIN — SUMATRIPTAN SUCCINATE PRN MG: 100 TABLET, FILM COATED ORAL at 06:02

## 2020-03-20 RX ADMIN — HYDROCODONE BITARTRATE AND ACETAMINOPHEN PRN ML: 7.5; 325 SOLUTION ORAL at 10:50

## 2020-03-20 RX ADMIN — HEPARIN SODIUM SCH UNITS: 5000 INJECTION INTRAVENOUS; SUBCUTANEOUS at 06:03

## 2020-03-20 RX ADMIN — HYDROCODONE BITARTRATE AND ACETAMINOPHEN PRN ML: 7.5; 325 SOLUTION ORAL at 04:01

## 2020-03-20 RX ADMIN — KETOROLAC TROMETHAMINE SCH MG: 15 INJECTION, SOLUTION INTRAMUSCULAR; INTRAVENOUS at 01:40

## 2020-03-20 RX ADMIN — DEXTROSE MONOHYDRATE, SODIUM CHLORIDE, AND POTASSIUM CHLORIDE SCH MLS/HR: 50; 4.5; 1.49 INJECTION, SOLUTION INTRAVENOUS at 08:01

## 2020-03-20 NOTE — DS
*** AMENDED REPORT NOW INCLUDES DESIGNATED COSIGNER ***



CC:  Dr. Ray Galaviz; [facility] *

 

DISCHARGE SUMMARY:

 

DATE OF ADMISSION:  03/18/20

 

DATE OF DISCHARGE:  03/20/20

 

ATTENDING PHYSICIAN:  Dr. Delmer Mendoza.* (DICTATED BY COLIN BRENNAN)

 

HOSPITAL COURSE:  Please refer to admission history and physical and operative 
note for details.  Briefly, the patient was taken to the operating room on 03/18
/20 and underwent a laparoscopic Alicja-en-Y gastric bypass with Dr. Mendoza.  
Surgery itself was uneventful and the postoperative course has been routine.  
Her upper GI study on the morning of postoperative day 1 showed patency of the 
anastomosis with no evidence of leak.  She was initiated on bariatric clear 
liquids which she has done quite well with.  She did have some difficulty with 
migraine, but will hopefully be able to resume all of her usual prophylactic 
migraine medications.  See separate progress note from the morning of 03/20/20.
  The patient was discharged to home in good condition.  She will resume her 
usual home medications with the following modifications:  She will hold her 
spironolactone for the present time.  She may use Meloxicam or over-the-counter 
ibuprofen on a limited p.r.n. basis for mild to moderate pain, or the 
prescribed hydrocodone liquid as necessary for stronger pain. She will change 
the omeprazole to a p.r.n. basis and likewise use her Excedrin extra strength 
only on a p.r.n. basis.

 

____________________________________ COLIN BRENNAN

 

[id number]/CPS #: [id number]

[facility] CenterSCARLETT

## 2020-03-20 NOTE — PN
Progress Note





- Progress Note


Date of Service: 03/20/20


Note: 





S: POD #2. Some nausea (no vomiting; drinking well); headache (despite 

sumatriptan). Passing flatus. Ambulating.





O:


 Vital Signs - 8 hr











  03/20/20 03/20/20 03/20/20





  00:40 03:55 04:01


 


Temperature  98.9 F 


 


Pulse Rate  81 


 


Respiratory  16 16





Rate   


 


Blood Pressure  121/74 





(mmHg)   


 


O2 Sat by Pulse 99 99 





Oximetry   














  03/20/20 03/20/20





  06:04 08:27


 


Temperature  98.2 F


 


Pulse Rate  84


 


Respiratory 16 20





Rate  


 


Blood Pressure  127/76





(mmHg)  


 


O2 Sat by Pulse  95





Oximetry  








Intake and Output Last 24 Hours











 03/18/20 03/19/20 03/20/20 03/21/20





 06:59 06:59 06:59 06:59


 


Intake Total  3762 3483 980


 


Output Total  870 4800 500


 


Balance  2892 -1317 480


 


Weight  247 lb 3.2 oz  


 


Intake:    


 


  IV Fluids  3762 1383 980


 


    D5W 1/2 NS 20 meq KCL    980


 


    LR  3762 1383 


 


  Oral  0 2100 


 


Output:    


 


  Urine  850 4800 500


 


  Estimated Blood Loss  20  








Gen: appears comfortable; NAD


Heart: reg


Lungs: clear


Abd: lap sites w/ small amts of dried drainage; dressings removed; +BS; soft; 

mild incisional tenderness only





A: s/p lap RYGB, doing well





P: ok for d/c home today; instructions reviewed; f/u next wk at [facility].